# Patient Record
Sex: FEMALE | Race: BLACK OR AFRICAN AMERICAN | NOT HISPANIC OR LATINO | URBAN - METROPOLITAN AREA
[De-identification: names, ages, dates, MRNs, and addresses within clinical notes are randomized per-mention and may not be internally consistent; named-entity substitution may affect disease eponyms.]

---

## 2021-05-28 ENCOUNTER — EMERGENCY (EMERGENCY)
Facility: HOSPITAL | Age: 46
LOS: 0 days | Discharge: HOME | End: 2021-05-28
Attending: STUDENT IN AN ORGANIZED HEALTH CARE EDUCATION/TRAINING PROGRAM
Payer: OTHER MISCELLANEOUS

## 2021-05-28 VITALS
HEIGHT: 66 IN | WEIGHT: 210.1 LBS | SYSTOLIC BLOOD PRESSURE: 137 MMHG | RESPIRATION RATE: 20 BRPM | OXYGEN SATURATION: 97 % | DIASTOLIC BLOOD PRESSURE: 85 MMHG | HEART RATE: 86 BPM | TEMPERATURE: 98 F

## 2021-05-28 DIAGNOSIS — M25.561 PAIN IN RIGHT KNEE: ICD-10-CM

## 2021-05-28 DIAGNOSIS — Z98.51 TUBAL LIGATION STATUS: ICD-10-CM
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
## 2021-05-28 DIAGNOSIS — T14.90XA INJURY, UNSPECIFIED, INITIAL ENCOUNTER: ICD-10-CM
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
## 2021-05-28 DIAGNOSIS — M25.461 EFFUSION, RIGHT KNEE: ICD-10-CM

## 2021-05-28 DIAGNOSIS — Z98.890 OTHER SPECIFIED POSTPROCEDURAL STATES: Chronic | ICD-10-CM

## 2021-05-28 DIAGNOSIS — Z98.51 TUBAL LIGATION STATUS: Chronic | ICD-10-CM

## 2021-05-28 PROCEDURE — 99283 EMERGENCY DEPT VISIT LOW MDM: CPT

## 2021-05-28 PROCEDURE — 73562 X-RAY EXAM OF KNEE 3: CPT | Mod: 26,RT

## 2021-05-28 RX ORDER — IBUPROFEN 200 MG
400 TABLET ORAL ONCE
Refills: 0 | Status: DISCONTINUED | OUTPATIENT
Start: 2021-05-28 | End: 2021-05-28

## 2021-05-28 RX ORDER — AMLODIPINE BESYLATE 2.5 MG/1
0 TABLET ORAL
Qty: 0 | Refills: 0 | DISCHARGE

## 2021-05-28 RX ORDER — IBUPROFEN 200 MG
1 TABLET ORAL
Qty: 28 | Refills: 0
Start: 2021-05-28 | End: 2021-06-03

## 2021-05-28 RX ORDER — IBUPROFEN 200 MG
400 TABLET ORAL ONCE
Refills: 0 | Status: COMPLETED | OUTPATIENT
Start: 2021-05-28 | End: 2021-05-28

## 2021-05-28 RX ORDER — ACETAMINOPHEN 500 MG
1 TABLET ORAL
Qty: 20 | Refills: 0
Start: 2021-05-28 | End: 2021-06-01

## 2021-05-28 RX ADMIN — Medication 400 MILLIGRAM(S): at 09:07

## 2021-05-28 NOTE — ED PROVIDER NOTE - DISPOSITION TYPE
DISCHARGE no chest pain/no dyspnea on exertion/no palpitations/no paroxysmal nocturnal dyspnea/no peripheral edema

## 2021-05-28 NOTE — ED PROVIDER NOTE - WR MDM LAUNCH
<----  Click to enter wet read (wet read can be reviewed in the Results section) no chest pain, no cough, and no shortness of breath.

## 2021-05-28 NOTE — ED ADULT NURSE REASSESSMENT NOTE - NS ED NURSE REASSESS COMMENT FT1
patient given crutch and ace wrap instructions. pt given discharge instructions. verbalized understanding. No further interventriosn at this time.

## 2021-05-28 NOTE — ED ADULT NURSE NOTE - OBJECTIVE STATEMENT
45 year old female c/o pain 7/10 to right knee pain on Wednesday after bumping into metal railing. Right knee warm to touch with some swelling. PMH htn

## 2021-05-28 NOTE — ED PROVIDER NOTE - CARE PROVIDER_API CALL
Afshin Centeno ()  Orthopaedic Surgery  [address] [place] [address]  Phone: [phone]  Fax: [phone]  Follow Up Time:     Martínez Laughlin (MD)  Orthopaedic Surgery; Sports Medicine  [address] [facility], [address] [address]  Phone: [phone]  Fax: [phone]  Follow Up Time:

## 2021-05-28 NOTE — ED PROVIDER NOTE - OBJECTIVE STATEMENT
45F pmhx htn presenting with right knee pain sicne wednesday. 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 +Swelling to the area; able to ambulate, though with difficulty. no fevers. no calf pain. no sob.

## 2021-05-28 NOTE — ED PROVIDER NOTE - PATIENT PORTAL LINK FT
You can access the FollowMyHealth Patient Portal offered by [facility] by registering at the following website: [email or web address]. By joining [employer]’s FollowMyHealth portal, you will also be able to view your health information using other applications (apps) compatible with our system.

## 2021-05-28 NOTE — ED PROVIDER NOTE - CARE PROVIDERS DIRECT ADDRESSES
,[email or web address]

## 2021-05-28 NOTE — ED PROVIDER NOTE - CLINICAL SUMMARY MEDICAL DECISION MAKING FREE TEXT BOX
no clinical evidence of DVT or septic joint. knee xray without obvious fracture. will d/c with ace wrap, crutches and ortho follow up

## 2021-05-28 NOTE — ED PROVIDER NOTE - MUSCULOSKELETAL, MLM
swelling to right knee with ttp on lateral aspect. no erythema or ecchymoses. no calf pain. ROM intact. no valgus or varus laxity. negative anterior/posterior drawer. Spine appears normal, range of motion is not limited, no other muscle or joint tenderness

## 2021-11-03 ENCOUNTER — APPOINTMENT (OUTPATIENT)
Dept: DERMATOLOGY | Facility: CLINIC | Age: 46
End: 2021-11-03